# Patient Record
Sex: FEMALE | Race: WHITE | NOT HISPANIC OR LATINO | URBAN - METROPOLITAN AREA
[De-identification: names, ages, dates, MRNs, and addresses within clinical notes are randomized per-mention and may not be internally consistent; named-entity substitution may affect disease eponyms.]

---

## 2017-05-02 ENCOUNTER — COMMUNICATION - HEALTHEAST (OUTPATIENT)
Dept: FAMILY MEDICINE | Facility: CLINIC | Age: 22
End: 2017-05-02

## 2017-05-02 DIAGNOSIS — Z30.9 CONTRACEPTION MANAGEMENT: ICD-10-CM

## 2017-07-21 ENCOUNTER — COMMUNICATION - HEALTHEAST (OUTPATIENT)
Dept: FAMILY MEDICINE | Facility: CLINIC | Age: 22
End: 2017-07-21

## 2017-07-21 DIAGNOSIS — Z30.9 CONTRACEPTION MANAGEMENT: ICD-10-CM

## 2017-10-13 ENCOUNTER — COMMUNICATION - HEALTHEAST (OUTPATIENT)
Dept: FAMILY MEDICINE | Facility: CLINIC | Age: 22
End: 2017-10-13

## 2017-10-13 DIAGNOSIS — Z30.9 CONTRACEPTION MANAGEMENT: ICD-10-CM

## 2017-10-14 ENCOUNTER — COMMUNICATION - HEALTHEAST (OUTPATIENT)
Dept: FAMILY MEDICINE | Facility: CLINIC | Age: 22
End: 2017-10-14

## 2017-10-14 DIAGNOSIS — Z30.9 CONTRACEPTION MANAGEMENT: ICD-10-CM

## 2017-11-10 ENCOUNTER — COMMUNICATION - HEALTHEAST (OUTPATIENT)
Dept: FAMILY MEDICINE | Facility: CLINIC | Age: 22
End: 2017-11-10

## 2017-11-10 DIAGNOSIS — Z30.9 CONTRACEPTION MANAGEMENT: ICD-10-CM

## 2017-11-28 ENCOUNTER — RECORDS - HEALTHEAST (OUTPATIENT)
Dept: ADMINISTRATIVE | Facility: OTHER | Age: 22
End: 2017-11-28

## 2017-12-26 ENCOUNTER — COMMUNICATION - HEALTHEAST (OUTPATIENT)
Dept: FAMILY MEDICINE | Facility: CLINIC | Age: 22
End: 2017-12-26

## 2017-12-26 DIAGNOSIS — Z30.9 CONTRACEPTION MANAGEMENT: ICD-10-CM

## 2017-12-29 ENCOUNTER — OFFICE VISIT - HEALTHEAST (OUTPATIENT)
Dept: FAMILY MEDICINE | Facility: CLINIC | Age: 22
End: 2017-12-29

## 2017-12-29 DIAGNOSIS — Z30.9 CONTRACEPTION MANAGEMENT: ICD-10-CM

## 2017-12-29 DIAGNOSIS — F32.81 PMDD (PREMENSTRUAL DYSPHORIC DISORDER): ICD-10-CM

## 2017-12-29 DIAGNOSIS — Z11.8 SCREENING FOR CHLAMYDIAL DISEASE: ICD-10-CM

## 2018-01-19 ENCOUNTER — OFFICE VISIT - HEALTHEAST (OUTPATIENT)
Dept: FAMILY MEDICINE | Facility: CLINIC | Age: 23
End: 2018-01-19

## 2018-01-19 DIAGNOSIS — Z00.00 ROUTINE GENERAL MEDICAL EXAMINATION AT A HEALTH CARE FACILITY: ICD-10-CM

## 2018-01-19 DIAGNOSIS — Z30.9 CONTRACEPTION MANAGEMENT: ICD-10-CM

## 2018-01-19 DIAGNOSIS — Z01.84 IMMUNITY STATUS TESTING: ICD-10-CM

## 2018-01-19 RX ORDER — DROSPIRENONE AND ETHINYL ESTRADIOL 0.02-3(28)
1 KIT ORAL DAILY
Qty: 84 TABLET | Refills: 3 | Status: SHIPPED | OUTPATIENT
Start: 2018-01-19

## 2018-01-19 ASSESSMENT — MIFFLIN-ST. JEOR: SCORE: 1284.06

## 2018-01-23 ENCOUNTER — COMMUNICATION - HEALTHEAST (OUTPATIENT)
Dept: FAMILY MEDICINE | Facility: CLINIC | Age: 23
End: 2018-01-23

## 2018-01-23 DIAGNOSIS — Z30.9 CONTRACEPTION MANAGEMENT: ICD-10-CM

## 2018-01-31 LAB — RABV NAB SER NT-ACNC: 0.5 IU/ML

## 2018-04-30 ENCOUNTER — COMMUNICATION - HEALTHEAST (OUTPATIENT)
Dept: FAMILY MEDICINE | Facility: CLINIC | Age: 23
End: 2018-04-30

## 2018-04-30 DIAGNOSIS — Z30.9 CONTRACEPTION MANAGEMENT: ICD-10-CM

## 2018-10-19 ENCOUNTER — RECORDS - HEALTHEAST (OUTPATIENT)
Dept: ADMINISTRATIVE | Facility: OTHER | Age: 23
End: 2018-10-19

## 2018-10-19 LAB
CHLAMYDIA_EXT- HISTORICAL: NORMAL
SPECIMEN DESCRIPTION_EXT (HISTORICAL CONVERSION): NORMAL

## 2018-11-06 ENCOUNTER — OFFICE VISIT - HEALTHEAST (OUTPATIENT)
Dept: FAMILY MEDICINE | Facility: CLINIC | Age: 23
End: 2018-11-06

## 2018-11-06 DIAGNOSIS — F32.81 PMDD (PREMENSTRUAL DYSPHORIC DISORDER): ICD-10-CM

## 2018-11-06 DIAGNOSIS — K62.5 RECTAL BLEEDING: ICD-10-CM

## 2018-11-06 LAB
ALBUMIN SERPL-MCNC: 3.5 G/DL (ref 3.5–5)
ALP SERPL-CCNC: 65 U/L (ref 45–120)
ALT SERPL W P-5'-P-CCNC: 28 U/L (ref 0–45)
ANION GAP SERPL CALCULATED.3IONS-SCNC: 9 MMOL/L (ref 5–18)
AST SERPL W P-5'-P-CCNC: 39 U/L (ref 0–40)
BASOPHILS # BLD AUTO: 0.1 THOU/UL (ref 0–0.2)
BASOPHILS NFR BLD AUTO: 1 % (ref 0–2)
BILIRUB SERPL-MCNC: 0.2 MG/DL (ref 0–1)
BUN SERPL-MCNC: 15 MG/DL (ref 8–22)
CALCIUM SERPL-MCNC: 9.1 MG/DL (ref 8.5–10.5)
CHLORIDE BLD-SCNC: 104 MMOL/L (ref 98–107)
CO2 SERPL-SCNC: 25 MMOL/L (ref 22–31)
CREAT SERPL-MCNC: 0.81 MG/DL (ref 0.6–1.1)
EOSINOPHIL # BLD AUTO: 0.2 THOU/UL (ref 0–0.4)
EOSINOPHIL NFR BLD AUTO: 2 % (ref 0–6)
ERYTHROCYTE [DISTWIDTH] IN BLOOD BY AUTOMATED COUNT: 10.3 % (ref 11–14.5)
GFR SERPL CREATININE-BSD FRML MDRD: >60 ML/MIN/1.73M2
GLUCOSE BLD-MCNC: 85 MG/DL (ref 70–125)
HCT VFR BLD AUTO: 37.8 % (ref 35–47)
HGB BLD-MCNC: 13 G/DL (ref 12–16)
INR PPP: 0.92 (ref 0.9–1.1)
LYMPHOCYTES # BLD AUTO: 1.9 THOU/UL (ref 0.8–4.4)
LYMPHOCYTES NFR BLD AUTO: 22 % (ref 20–40)
MCH RBC QN AUTO: 30.9 PG (ref 27–34)
MCHC RBC AUTO-ENTMCNC: 34.3 G/DL (ref 32–36)
MCV RBC AUTO: 90 FL (ref 80–100)
MONOCYTES # BLD AUTO: 0.5 THOU/UL (ref 0–0.9)
MONOCYTES NFR BLD AUTO: 6 % (ref 2–10)
NEUTROPHILS # BLD AUTO: 6.2 THOU/UL (ref 2–7.7)
NEUTROPHILS NFR BLD AUTO: 70 % (ref 50–70)
PLATELET # BLD AUTO: 175 THOU/UL (ref 140–440)
PMV BLD AUTO: 9 FL (ref 7–10)
POTASSIUM BLD-SCNC: 4.6 MMOL/L (ref 3.5–5)
PROT SERPL-MCNC: 6.3 G/DL (ref 6–8)
RBC # BLD AUTO: 4.2 MILL/UL (ref 3.8–5.4)
SODIUM SERPL-SCNC: 138 MMOL/L (ref 136–145)
WBC: 8.8 THOU/UL (ref 4–11)

## 2018-11-08 LAB
BAKER'S YEAST IGA QN IA: <10 U
BAKER'S YEAST IGG QN IA: <10 U
NEUTROPHIL SPECIFIC ANTIBODIES (CONVERSION): NEGATIVE

## 2019-02-27 ENCOUNTER — COMMUNICATION - HEALTHEAST (OUTPATIENT)
Dept: FAMILY MEDICINE | Facility: CLINIC | Age: 24
End: 2019-02-27

## 2019-07-07 ENCOUNTER — COMMUNICATION - HEALTHEAST (OUTPATIENT)
Dept: FAMILY MEDICINE | Facility: CLINIC | Age: 24
End: 2019-07-07

## 2019-07-07 DIAGNOSIS — F32.A DEPRESSION: ICD-10-CM

## 2019-07-29 ENCOUNTER — OFFICE VISIT - HEALTHEAST (OUTPATIENT)
Dept: FAMILY MEDICINE | Facility: CLINIC | Age: 24
End: 2019-07-29

## 2019-07-29 DIAGNOSIS — Z13.29 SCREENING FOR ENDOCRINE, NUTRITIONAL, METABOLIC AND IMMUNITY DISORDER: ICD-10-CM

## 2019-07-29 DIAGNOSIS — Z13.21 SCREENING FOR ENDOCRINE, NUTRITIONAL, METABOLIC AND IMMUNITY DISORDER: ICD-10-CM

## 2019-07-29 DIAGNOSIS — Z13.228 SCREENING FOR ENDOCRINE, NUTRITIONAL, METABOLIC AND IMMUNITY DISORDER: ICD-10-CM

## 2019-07-29 DIAGNOSIS — Z11.4 SCREENING FOR HIV WITHOUT PRESENCE OF RISK FACTORS: ICD-10-CM

## 2019-07-29 DIAGNOSIS — Z00.00 VISIT FOR PREVENTIVE HEALTH EXAMINATION: ICD-10-CM

## 2019-07-29 DIAGNOSIS — Z13.0 SCREENING FOR ENDOCRINE, NUTRITIONAL, METABOLIC AND IMMUNITY DISORDER: ICD-10-CM

## 2019-07-29 DIAGNOSIS — F41.1 GAD (GENERALIZED ANXIETY DISORDER): ICD-10-CM

## 2019-07-29 LAB
ALBUMIN SERPL-MCNC: 3.4 G/DL (ref 3.5–5)
ALP SERPL-CCNC: 54 U/L (ref 45–120)
ALT SERPL W P-5'-P-CCNC: 21 U/L (ref 0–45)
ANION GAP SERPL CALCULATED.3IONS-SCNC: 9 MMOL/L (ref 5–18)
AST SERPL W P-5'-P-CCNC: 26 U/L (ref 0–40)
BILIRUB SERPL-MCNC: 0.3 MG/DL (ref 0–1)
BUN SERPL-MCNC: 14 MG/DL (ref 8–22)
CALCIUM SERPL-MCNC: 9.5 MG/DL (ref 8.5–10.5)
CHLORIDE BLD-SCNC: 107 MMOL/L (ref 98–107)
CHOLEST SERPL-MCNC: 183 MG/DL
CO2 SERPL-SCNC: 25 MMOL/L (ref 22–31)
CREAT SERPL-MCNC: 0.83 MG/DL (ref 0.6–1.1)
FASTING STATUS PATIENT QL REPORTED: YES
GFR SERPL CREATININE-BSD FRML MDRD: >60 ML/MIN/1.73M2
GLUCOSE BLD-MCNC: 79 MG/DL (ref 70–125)
HDLC SERPL-MCNC: 79 MG/DL
HIV 1+2 AB+HIV1 P24 AG SERPL QL IA: NEGATIVE
LDLC SERPL CALC-MCNC: 85 MG/DL
POTASSIUM BLD-SCNC: 4.4 MMOL/L (ref 3.5–5)
PROT SERPL-MCNC: 6 G/DL (ref 6–8)
SODIUM SERPL-SCNC: 141 MMOL/L (ref 136–145)
TRIGL SERPL-MCNC: 97 MG/DL

## 2019-07-29 ASSESSMENT — MIFFLIN-ST. JEOR: SCORE: 1273.86

## 2019-08-16 ENCOUNTER — COMMUNICATION - HEALTHEAST (OUTPATIENT)
Dept: FAMILY MEDICINE | Facility: CLINIC | Age: 24
End: 2019-08-16

## 2019-08-16 DIAGNOSIS — F41.1 GAD (GENERALIZED ANXIETY DISORDER): ICD-10-CM

## 2019-08-26 ENCOUNTER — RECORDS - HEALTHEAST (OUTPATIENT)
Dept: HEALTH INFORMATION MANAGEMENT | Facility: CLINIC | Age: 24
End: 2019-08-26

## 2019-10-24 ENCOUNTER — OFFICE VISIT - HEALTHEAST (OUTPATIENT)
Dept: FAMILY MEDICINE | Facility: CLINIC | Age: 24
End: 2019-10-24

## 2019-10-24 DIAGNOSIS — K21.9 GASTRO-ESOPHAGEAL REFLUX DISEASE WITHOUT ESOPHAGITIS: ICD-10-CM

## 2019-10-24 DIAGNOSIS — R12 HEARTBURN: ICD-10-CM

## 2019-10-24 RX ORDER — SUCRALFATE ORAL 1 G/10ML
1 SUSPENSION ORAL 4 TIMES DAILY
Qty: 420 ML | Refills: 1 | Status: SHIPPED | OUTPATIENT
Start: 2019-10-24

## 2019-11-15 ENCOUNTER — COMMUNICATION - HEALTHEAST (OUTPATIENT)
Dept: FAMILY MEDICINE | Facility: CLINIC | Age: 24
End: 2019-11-15

## 2019-11-15 DIAGNOSIS — K21.9 GASTRO-ESOPHAGEAL REFLUX DISEASE WITHOUT ESOPHAGITIS: ICD-10-CM

## 2019-11-16 RX ORDER — FAMOTIDINE 20 MG/1
TABLET, FILM COATED ORAL
Qty: 180 TABLET | Refills: 1 | Status: SHIPPED | OUTPATIENT
Start: 2019-11-16

## 2020-05-13 ENCOUNTER — OFFICE VISIT - HEALTHEAST (OUTPATIENT)
Dept: FAMILY MEDICINE | Facility: CLINIC | Age: 25
End: 2020-05-13

## 2020-05-13 DIAGNOSIS — F41.1 GAD (GENERALIZED ANXIETY DISORDER): ICD-10-CM

## 2020-05-13 DIAGNOSIS — R53.83 FATIGUE, UNSPECIFIED TYPE: ICD-10-CM

## 2020-08-04 ENCOUNTER — COMMUNICATION - HEALTHEAST (OUTPATIENT)
Dept: FAMILY MEDICINE | Facility: CLINIC | Age: 25
End: 2020-08-04

## 2020-08-04 DIAGNOSIS — F41.1 GAD (GENERALIZED ANXIETY DISORDER): ICD-10-CM

## 2020-08-11 ENCOUNTER — COMMUNICATION - HEALTHEAST (OUTPATIENT)
Dept: FAMILY MEDICINE | Facility: CLINIC | Age: 25
End: 2020-08-11

## 2021-05-25 ENCOUNTER — RECORDS - HEALTHEAST (OUTPATIENT)
Dept: ADMINISTRATIVE | Facility: CLINIC | Age: 26
End: 2021-05-25

## 2021-05-27 ENCOUNTER — RECORDS - HEALTHEAST (OUTPATIENT)
Dept: ADMINISTRATIVE | Facility: CLINIC | Age: 26
End: 2021-05-27

## 2021-05-30 NOTE — TELEPHONE ENCOUNTER
Refill Approved    Rx renewed per Medication Renewal Policy. Medication was last renewed on 11/16/2018.    Eric Zavala, [facility] Triage/Med Refill 7/7/2019     Requested Prescriptions   Pending Prescriptions Disp Refills     FLUoxetine (PROZAC) 20 MG capsule [Pharmacy Med Name: FLUOXETINE HCL 20 MG CAPSULE] 30 capsule 1     Sig: TAKE 1 CAPSULE BY MOUTH EVERY DAY       SSRI Refill Protocol  Passed - 7/7/2019  9:32 AM        Passed - PCP or prescribing provider visit in last year     Last office visit with prescriber/PCP: 11/6/2018 India Chairez MD OR same dept: 11/6/2018 India Chairez MD OR same specialty: 11/6/2018 India Chairez MD  Last physical: Visit date not found Last MTM visit: Visit date not found   Next visit within 3 mo: Visit date not found  Next physical within 3 mo: Visit date not found  Prescriber OR PCP: India Chairez MD  Last diagnosis associated with med order: There are no diagnoses linked to this encounter.  If protocol passes may refill for 12 months if within 3 months of last provider visit (or a total of 15 months).

## 2021-05-30 NOTE — PROGRESS NOTES
FEMALE ADULT PREVENTIVE EXAM    CHIEF COMPLAINT:  Female preventive exam.    SUBJECTIVE:  Dorcas Taylor is a 24 y.o. female who presents for her routine physical exam.    Patient would like to address the following concerns today: Anxiety seems to be to be more pronounced lately, has been on Prozac 20 mg for the past 3 years, wondering if  Increasing the dose will help.  Jammed her right fifth pinky while playing volleyball about a month ago, occasionally feel a click in the morning.  But no pain or swelling.  Screening Pap smear done at the GYN office, she did sign for release of record.      GYNE HISTORY  Menses: yes  Sexually Active: na  Contraception: yes OCP  Last Pap: 2018 (@ [facility])  Abnormal Pap: no  Vaginal Discharge: no      She  has a past medical history of Anxiety, Major depression, single episode, and PMDD (premenstrual dysphoric disorder).    Lab Results   Component Value Date    WBC 8.8 11/06/2018    HGB 13.0 11/06/2018    HCT 37.8 11/06/2018    MCV 90 11/06/2018     11/06/2018     11/06/2018    K 4.6 11/06/2018    BUN 15 11/06/2018     No results found for: CHOL, HDL, LDLCALC, TRIG  Lab Results   Component Value Date    TSH 0.94 06/17/2014     BP Readings from Last 3 Encounters:   07/29/19 91/60   11/06/18 110/58   01/19/18 90/60       Surgeries:    Past Surgical History:   Procedure Laterality Date     WISDOM TOOTH EXTRACTION         Family History:  Her family history includes Breast cancer (age of onset: 47) in her paternal aunt; Depression in her paternal grandmother; Hypertension in her paternal grandfather; Lymphoma (age of onset: 70) in her maternal grandmother.    Social History:  She  reports that she has never smoked. She has never used smokeless tobacco. She reports that she does not drink alcohol or use drugs.    Medications:    Current Outpatient Medications:      FLUoxetine (PROZAC) 40 MG capsule, TAKE 1 CAPSULE BY MOUTH EVERY DAY, Disp: 30 capsule, Rfl: 6      GIANVI, 28, 3-0.02 mg per tablet, Take 1 tablet by mouth daily., Disp: 84 tablet, Rfl: 3     mv-min/iron/folic/calcium/vitK (WOMEN'S MULTIVITAMIN ORAL), Take by mouth., Disp: , Rfl:   HELD MEDICATIONS: None.    Allergies:  No latex allergies.  Allergies   Allergen Reactions     Septra [Sulfamethoxazole-Trimethoprim] Angioedema     Facial swelling.     Sulfa (Sulfonamide Antibiotics)             RISK BEHAVIOR & HEALTH HABITS  Self Breast Exam: yes.  Regular Exercise: yes.  Balanced diet: yes.  Seat Belt Use: yes  Calcium intake/Osteoporosis prevention: yes.    Guns: NA  Sun Screen: YES  Dental Care: YES    REVIEW OF SYSTEMS:  Complete head to toe review of systems is otherwise negative except as above.    OBJECTIVE:  VITAL SIGNS:    Vitals:    07/29/19 1005   BP: 91/60   Pulse: 62   SpO2: 97%     GENERAL:  Patient alert, in no acute distress.  EYES: PERRLA. Extraoccular movements intact, pupils equal, reactive to light and accommodation.  Normal conjunctiva and lids.    ENT:  Hearing grossly normal.  Normal appearance to ears and nose.  Bilateral TM s, external canals, oropharynx normal. Normal lips, gums and teeth.    NECK:  Supple, without thyromegaly or mass, no adenopathies.  RESP:  Clear to auscultation without crackles, wheezes or distress.  Normal respiratory effort.   CV:  Regular rate and rhythm without murmurs, rubs or gallops.  Normal pedal pulses.  No varicosities or edema.  ABDOMEN:  Soft, non-tender, without hepatosplenomegaly, masses, or hernias.   BREASTS:  declined   :  Rectal: declined   LYMPHATIC: Normal palpation of neck.  No lymphadenopathy.  No bruising.  NEURO:  CN II-XII intact, motor & sensory function all intact.  DTR and reflexes normal.  PSYCHIATRIC:  Alert & oriented with normal mood and affect.  Good judgment and insight.  SKIN:  Normal inspection and palpation.  MUSCULOSKELETAL: Normal gait and station.  - Spine / Ribs / Pelvis: Normal inspection, ROM, stability and strength: Spine,  Head, Neck, Upper and Lower Extremities.    ASSESSMENT & PLAN  Dorcas was seen today for establish care, annual exam, medication dose change and hand pain.    Diagnoses and all orders for this visit:    Visit for preventive health examination    QUE (generalized anxiety disorder)  -     FLUoxetine (PROZAC) 40 MG capsule; TAKE 1 CAPSULE BY MOUTH EVERY DAY    Screening for endocrine, nutritional, metabolic and immunity disorder  -     Comprehensive Metabolic Panel  -     Lipid Cascade    Screening for HIV without presence of risk factors  -     HIV Antigen/Antibody Screening [place]      General  Immunizations reviewed and updated .  Alcohol use, safety and moderation discussed.  Recommended adequate calcium, vitamin D intake/osteoporosis prevention.  Discussed colon cancer screening at age 50, 45 if -American.  Diet and exercise reviewed, including goal of aerobic exercise 30-90 minutes most days of the week, moderation of portions sizes, avoiding eating out and fast food and increase in fruits and vegetables.  Discussed safe sex practices.    Discussed & recommended seat belt (& motorcycle helmet) use.  Discussed & recommended smoke detector.  Discussed sun protection.  Discussed weight management.  Discussed self breast exam, screening mammogram timing.  The following high BMI interventions were performed this visit: encouragement to exercise    India Chairez MD

## 2021-05-31 VITALS — BODY MASS INDEX: 20.24 KG/M2 | HEIGHT: 65 IN | WEIGHT: 121.5 LBS

## 2021-05-31 VITALS — WEIGHT: 120.9 LBS | BODY MASS INDEX: 20.12 KG/M2

## 2021-05-31 NOTE — TELEPHONE ENCOUNTER
Refill Approved   Pharmacy note for 90 day supply.  Also a note for PA    Rx renewed per Medication Renewal Policy. Medication was last renewed on 7/29/2019 for 30/6  Last OV 7/29/2019 to Establish Care    Kaylee Summers, [facility] Triage/Med Refill 8/17/2019     Requested Prescriptions   Pending Prescriptions Disp Refills     FLUoxetine (PROZAC) 40 MG capsule 90 capsule 0     Sig: TAKE 1 CAPSULE BY MOUTH EVERY DAY       SSRI Refill Protocol  Passed - 8/16/2019  2:10 PM        Passed - PCP or prescribing provider visit in last year     Last office visit with prescriber/PCP: 11/6/2018 India Chairez MD OR same dept: 11/6/2018 India Chairez MD OR same specialty: 11/6/2018 India Chairez MD  Last physical: 7/29/2019 Last MTM visit: Visit date not found   Next visit within 3 mo: Visit date not found  Next physical within 3 mo: Visit date not found  Prescriber OR PCP: India Chairez MD  Last diagnosis associated with med order: 1. QUE (generalized anxiety disorder)  - FLUoxetine (PROZAC) 40 MG capsule; TAKE 1 CAPSULE BY MOUTH EVERY DAY  Dispense: 90 capsule; Refill: 0    If protocol passes may refill for 12 months if within 3 months of last provider visit (or a total of 15 months).

## 2021-06-02 VITALS — BODY MASS INDEX: 20.79 KG/M2 | WEIGHT: 123 LBS

## 2021-06-03 VITALS — HEIGHT: 65 IN | BODY MASS INDEX: 19.58 KG/M2 | WEIGHT: 117.5 LBS

## 2021-06-03 NOTE — TELEPHONE ENCOUNTER
Refill Approved    Rx renewed per Medication Renewal Policy. Medication was last renewed on 10/24/19.    Mira Golden, [facility] Triage/Med Refill 11/16/2019     Requested Prescriptions   Pending Prescriptions Disp Refills     famotidine (PEPCID) 20 MG tablet [Pharmacy Med Name: FAMOTIDINE 20 MG TABLET] 60 tablet 2     Sig: TAKE 1 TABLET BY MOUTH TWICE A DAY       GI Medications Refill Protocol Passed - 11/15/2019  9:36 AM        Passed - PCP or prescribing provider visit in last 12 or next 3 months.     Last office visit with prescriber/PCP: 11/6/2018 India Chairez MD OR same dept: Visit date not found OR same specialty: 11/6/2018 nIdia Chairez MD  Last physical: 7/29/2019 Last MTM visit: Visit date not found   Next visit within 3 mo: Visit date not found  Next physical within 3 mo: Visit date not found  Prescriber OR PCP: India Chairez MD  Last diagnosis associated with med order: 1. Gastro-esophageal reflux disease without esophagitis  - famotidine (PEPCID) 20 MG tablet [Pharmacy Med Name: FAMOTIDINE 20 MG TABLET]; TAKE 1 TABLET BY MOUTH TWICE A DAY  Dispense: 60 tablet; Refill: 2    If protocol passes may refill for 12 months if within 3 months of last provider visit (or a total of 15 months).

## 2021-06-08 NOTE — PATIENT INSTRUCTIONS - HE
Based on the information that you have provided,it  is difficult to determine if this is only the medication or perhaps something else and I am sure the current situation with the COVID-19 crisis is not making things better; my suggestion will be to taper down the Prozac to 20 mg daily, start taking vitamin D 2 to 4000 unit daily, and hopefully your  symptoms will improve, otherwise I did place some future order to get some lab works(thyroid, iron etc.) done, you can just call to schedule.

## 2021-06-10 NOTE — TELEPHONE ENCOUNTER
RN cannot approve Refill Request    RN can NOT refill this medication Protocol failed and NO refill given. Last office visit: 11/6/2018 India Chairez MD Last Physical: 7/29/2019 Last MTM visit: Visit date not found Last visit same specialty: 11/6/2018 India Chairez MD.  Next visit within 3 mo: Visit date not found  Next physical within 3 mo: Visit date not found      Erika Cagle, [facility] Triage/Med Refill 8/4/2020    Requested Prescriptions   Pending Prescriptions Disp Refills     FLUoxetine (PROZAC) 20 MG capsule [Pharmacy Med Name: FLUOXETINE HCL 20 MG CAPSULE] 90 capsule 0     Sig: TAKE 1 CAPSULE BY MOUTH EVERY DAY       SSRI Refill Protocol  Failed - 8/4/2020  2:06 AM        Failed - PCP or prescribing provider visit in last year     Last office visit with prescriber/PCP: 11/6/2018 India Chairez MD OR same dept: Visit date not found OR same specialty: 11/6/2018 India Chairez MD  Last physical: 7/29/2019 Last MTM visit: Visit date not found   Next visit within 3 mo: Visit date not found  Next physical within 3 mo: Visit date not found  Prescriber OR PCP: India Chairez MD  Last diagnosis associated with med order: 1. QUE (generalized anxiety disorder)  - FLUoxetine (PROZAC) 20 MG capsule [Pharmacy Med Name: FLUOXETINE HCL 20 MG CAPSULE]; TAKE 1 CAPSULE BY MOUTH EVERY DAY  Dispense: 90 capsule; Refill: 0    If protocol passes may refill for 12 months if within 3 months of last provider visit (or a total of 15 months).

## 2021-06-10 NOTE — TELEPHONE ENCOUNTER
Left message #2 at [phone]. Sending letter out and postponing task out to 2 weeks and will try again if an appointment hasn't been made.

## 2021-06-15 NOTE — PROGRESS NOTES
Assessment/Plan:        Diagnoses and all orders for this visit:    Routine general medical examination at a health care facility    Contraception management  -     GIANVI, 28, 3-0.02 mg per tablet; Take 1 tablet by mouth daily.  Dispense: 84 tablet; Refill: 3    Immunity status testing  -     Rabies Antibody Endpoint        Continue with OCP.  Discussed medication and side effects.  Monitor blood pressure.  Continue with healthy food choices, regular exercise.  Encourage annual physical.  We will also do a rabies titer and inform her once results are available.  She was agreeable with the plans.  Subjective:    Patient ID: Dorcas Taylor is a 22 y.o. female.    HPI    Dorcas is here for her physical.  She is currently in OCP.  Has not been able to check her blood pressure on her own.  Has been taking her medication regularly.  Denies any ill effects from it.  Her last Pap smear was in June 2016, normal.  Screening for chlamydia in December 2017, negative.    Her Prozac is followed by her gynecologist.  Use for anxiety, depression, PMDD.    She wonders about having her rabies titer done.  Her last check was in 2016.  Her last rabies preexposure prophylaxis was given around 7-8 years ago.  No recent injury.    Review of Systems  As above otherwise negative.    Past medical history, surgery and family history reviewed and as above.  Paternal aunt with breast cancer at 47.  Maternal grandmother with lymphoma.  Paternal grandmother with depression.  Paternal grandfather with hypertension.    Social history: Denies any issues with smoking.  1-2 alcoholic beverage per week.  She tries to exercise 3 days a week.  Aerobics or weights for around 45-60 minutes.  Has been eating healthy.  Limited red meat or sweets.  She is a student right now, .  Working at the [employer] as well.        Objective:    Physical Exam  BP 90/60 (Patient Site: Left Arm, Patient Position: Sitting, Cuff Size: Adult Regular)   "Pulse (!) 54  Ht 5' 4.5\" (1.638 m)  Wt 121 lb 8 oz (55.1 kg)  SpO2 100%  Breastfeeding? No  BMI 20.53 kg/m2    Vital signs noted above. AAO ×3.  HEENT negative.  Neck: Supple neck, nonpalpable cervical lymph nodes. No thyromegaly. Lungs: Clear to auscultation bilateral.  Heart: S1-S2 regular rate and rhythm, no murmurs were noted.  Abdomen: Flat, soft with bowel sounds and nontender.  Extremities: No edema, pulses were full and equal. Breast exam: No nipple bleeding or discharge, no mass or tenderness, no axillary lymphadenopathy.  Pelvic exam: Negative CMT, no adnexal mass or tenderness.          "

## 2021-06-15 NOTE — PROGRESS NOTES
Assessment/Plan:        Diagnoses and all orders for this visit:    Screening for chlamydial disease  -     Chlamydia trachomatis & Neisseria gonorrhoeae, Amplified Detection    Contraception management  -     GIANVI, 28, 3-0.02 mg per tablet; Take 1 tablet by mouth daily.  Dispense: 84 tablet; Refill: 3    PMDD (premenstrual dysphoric disorder)    Other orders  -     FLUoxetine (PROZAC) 20 MG capsule;   -     Discontinue: permethrin (ELIMITE) 5 % cream;   -     Discontinue: triamcinolone (KENALOG) 0.1 % cream;         Continue with the OCP at this time.  Discussed medication and side effects including other contraceptives and IUD.  Monitor blood pressure.  Encourage annual physical.  Will also do chlamydia, gonorrhea screening.  She will check with GYN regarding her fluoxetine as the have been managing it for her for her PMDD.  She will decide in the future if she would have it managed in one place and consider physical.  She was agreeable with the plans.    25 minutes spent with more than 50% in counseling discussion of treatment plans.  Subjective:    Patient ID: Dorcas Taylor is a 22 y.o. female.    HPI    Dorcas is here for follow-up regarding her medication.  Currently on Gianvi.  Has been taking this for PMDD and contraceptive purposes.  She denies any changes with her cycles or spotting in between.  No breast discomfort or soreness.  Denies any smoking.  No history of thrombophilia.  Paternal aunt diagnosed with breast cancer at 47.      She was started on fluoxetine by GYN.  This for PMDD.  Has seen them a few times.  Unsure when her last visit with them.  Her last physical was in June 2016.    Family history: Maternal grandmother with lymphoma.    Review of Systems  As above otherwise negative.          Objective:    Physical Exam  /60 (Patient Site: Left Arm, Patient Position: Sitting, Cuff Size: Adult Regular)  Pulse (!) 58  Wt 120 lb 14.4 oz (54.8 kg)  SpO2 98%  Breastfeeding? No  BMI  20.12 kg/m2    Vital signs noted above. AAO ×3.  HEENT no nasal discharge, moist oral mucosa. Neck: Supple neck, nonpalpable cervical lymph nodes.  Lungs: Clear to auscultation bilateral.  Heart: S1-S2 regular rate and rhythm, no murmurs were noted.  Abdomen: Flat, soft with bowel sounds and nontender.

## 2021-06-16 PROBLEM — F32.81 PMDD (PREMENSTRUAL DYSPHORIC DISORDER): Status: ACTIVE | Noted: 2017-12-29

## 2021-06-16 PROBLEM — F41.1 GAD (GENERALIZED ANXIETY DISORDER): Status: ACTIVE | Noted: 2019-07-29

## 2021-06-17 NOTE — PATIENT INSTRUCTIONS - HE
Patient Instructions by India Chairez MD at 7/29/2019 10:00 AM     Author: India Chairez MD Service: -- Author Type: Physician    Filed: 7/29/2019 10:45 AM Encounter Date: 7/29/2019 Status: Signed    : India Chairez MD (Physician)       Patient Education     Prevention Guidelines, Women Ages 18 to 39  Screening tests and vaccines are an important part of managing your health. A screening test is done to find possible disorders or diseases in people who don't have any symptoms. The goal is to find a disease early so lifestyle changes can be made and you can be watched more closely to reduce the risk of disease, or to detect it early enough to treat it most effectively. Screening tests are not considered diagnostic, but are used to determine if more testing is needed. Health counseling is essential, too. Below are guidelines for these, for women ages 18 to 39. Talk with your healthcare provider to make sure youre up-to-date on what you need.  Screening Who needs it How often   Alcohol misuse All women in this age group At routine exams   Blood pressure All women in this age group Yearly checkup if your blood pressure is normal  Normal blood pressure is less than 120/80 mm Hg  If your blood pressure reading is higher than normal, follow the advice of your healthcare provider   Breast cancer All women in this age group should talk with their healthcare providers about the need for clinical breast exams (CBE)1 Clinical breast exam every 3 years1   Cervical cancer Women ages 21 and older Women between ages 21 and 29 should have a Pap test every 3 years; women between ages 30 and 65 are advised to have a Pap test plus an HPV test every 5 years   Chlamydia Sexually active women ages 24 and younger, and women at increased risk for infection Every 3 years if you're at risk or have symptoms   Depression All women in this age group At routine exams   Type 2 diabates, prediabetes All  women with no symptoms who are overweight or obese and have 1 or more other risk factors for diabetes At least every 3 years. Also, testing for diabetes during pregnancy after the 24th week.    Type 2 diabetes, prediabetes All women diagnosed with gestational diabetes Lifelong testing every 3 years   Type 2 diabetes All women with prediabetes Every year   Gonorrhea Sexually active women at increased risk for infection At routine exams   Hepatitis C Anyone at increased risk At routine exams   HIV All women should be tested at least once for HIV between the ages of 13 and 64 At routine exams. Those with risk factors for HIV should be tested at least annually.    Obesity All women in this age group At routine exams   Syphilis Women at increased risk for infection should talk with their healthcare provider At routine exams   Tuberculosis Women at increased risk for infection should talk with their healthcare provider Ask your healthcare provider   Vision All women in this age group At least 1 complete exam in your 20s, and 2 in your 30s   Vaccine2 Who needs it How often   Chickenpox (varicella) All women in this age group who have no record of this infection or vaccine 2 doses; the second dose should be given 4 to 8 weeks after the first dose   Hepatitis A Women at increased risk for infection should talk with their healthcare provider 2 doses given at least 6 months apart   Hepatitis B Women at increased risk for infection should talk with their healthcare provider 3 doses over 6 months; second dose should be given 1 month after the first dose; the third dose should be given at least 2 months after the second dose and at least 4 months after the first dose   Haemophilus influenzae Type B (HIB) Women at increased risk for infection should talk with their healthcare provider 1 to 3 doses   Human papillomavirus (HPV) All women in this age group up to age 26 3 doses; the second dose should be given 1 to 2 months after the  first dose and the third dose given 6 months after the first dose   Influenza (flu) All women in this age group Once a year   Measles, mumps, rubella (MMR) All women in this age group who have no record of these infections or vaccines 1 or 2 doses   Meningococcal Women at increased risk for infection should talk with their healthcare provider 1 or more doses   Pneumococcal conjugate vaccine (PCV13) and pneumococcal polysaccharide vaccine (PPSV23) Women at increased risk for infection should talk with their healthcare provider PCV13: 1 dose ages 19 to 65 (protects against 13 types of pneumococcal bacteria)  PPSV23: 1 to 2 doses through age 64, or 1 dose at 65 or older (protects against 23 types of pneumococcal bacteria)      Tetanus/diphtheria/pertussis (Td/Tdap) booster All women in this age group Td every 10 years, or a one-time dose of Tdap instead of a Td booster after age 18, then Td every 10 years   Counseling Who needs it How often   BRCA gene mutation testing for breast and ovarian cancer susceptibility Women with increased risk for having gene mutation When your risk is known   Breast cancer and chemoprevention Women at high risk for breast cancer When your risk is known   Diet and exercise Women who are overweight or obese When diagnosed, and then at routine exams   Domestic violence Women at the age in which they are able to have children At routine exams   Sexually transmitted infection prevention Women who are sexually active At routine exams   Skin cancer Prevention of skin cancer in fair-skinned adults At routine exams   Use of tobacco and the health effects it can cause All women in this age group Every visit   1According to the ACS, women ages 20 to 39 years should have a clinical breast exam (CBE) as part of their routine health exam every 3 years. Breast self-exams are an option for women starting in their 20s. But the USPSTF does not recommend CBE.  2Those who are 18 years old and not up-to-date  on their childhood vaccines should get all appropriate catch-up vaccines recommended by the CDC.  Date Last Reviewed: 10/1/2017    5371-6187 [employer] [address] [facility], [place] [address]. All rights reserved. This information is not intended as a substitute for professional medical care. Always follow your healthcare professional's instructions.

## 2021-06-17 NOTE — PATIENT INSTRUCTIONS - HE
Patient Instructions by India Chairez MD at 10/24/2019 12:27 PM     Author: India Chairez MD Service: -- Author Type: Physician    Filed: 10/24/2019 12:27 PM Encounter Date: 10/24/2019 Status: Signed    : India Chairez MD (Physician)           Lifestyle Changes for Controlling GERD  When you have GERD, stomach acid feels as if its backing up toward your mouth. Making lifestyle changes can often improve your symptoms. This is true if you take medicine to control your GERD or not. Talk with your healthcare provider about the following suggestions. They may help you get relief from your symptoms.      Raise your head  Reflux is more likely to happen when youre lying down flat. That's because stomach fluid can flow backward more easily. Raising the head of your bed 4 to 6 inches can help. To do this:    Slide blocks or books under the legs at the head of your bed. Or put a wedge under the mattress. Many [employer] can make a wedge for you. The wedge should go from your waist to the top of your head.    Dont just prop your head up on a few pillows. This increases pressure on your stomach. It can make GERD worse.  Watch your eating habits  Certain foods may increase the acid in your stomach. Or they may relax the lower esophageal sphincter. This makes GERD more likely. Its best to avoid the following if they cause you symptoms:    Coffee, tea, and carbonated drinks (with and without caffeine)    Fatty, fried, or spicy food    Mint, chocolate, onions, tomatoes, and citrus    Peppermint    Any other foods that seem to irritate your stomach or cause you pain  Relieve the pressure  Tips include the following:    Eat smaller meals, even if you have to eat more often.    Dont lie down right after you eat. Wait a few hours for your stomach to empty.    Don't wear tight belts or tight-fitting clothes.    Lose any extra weight.  Tobacco and alcohol  Don't smoke tobacco or drink alcohol.  They can make GERD symptoms worse.  Date Last Reviewed: 7/1/2016 2000-2019 [employer]. [address] [place] [address], [place] [address]. All rights reserved. This information is not intended as a substitute for professional medical care. Always follow your healthcare professional's instructions.        Avoid eating before sleep, and avoid alcohol and caffeine while symptoms are ongoing.

## 2021-06-20 NOTE — LETTER
Letter by India Chairez MD at      Author: India Chairez MD Service: -- Author Type: --    Filed:  Encounter Date: 8/11/2020 Status: (Other)         Dorcas Taylor  [address] [place] [address]      August 11, 2020      Dear Dorcas,    As a valued [facility] patient, your healthcare needs are our priority.  Your health care team has determined that you are due for an appointment regarding your medication follow up.    To help prevent delays in your care, please call the [facility] at [phone].    We look forward to partnering with you to achieve optimal health and wellbeing.    Sincerely,  Your care team at [facility] and [facility]

## 2021-06-21 NOTE — PROGRESS NOTES
OFFICE VISIT - FAMILY MEDICINE     ASSESSMENT AND PLAN     1. Rectal bleeding  HM1(CBC and Differential)    Inflammatory Bowel Disease (IBD) Serology Panel    Comprehensive Metabolic Panel    INR    HM1(CBC and Differential)    Inflammatory Bowel Disease (IBD) Serology Panel    Comprehensive Metabolic Panel    INR    HM1 (CBC with Diff)   2. PMDD (premenstrual dysphoric disorder)     Rectal bleeding, differential diagnosis discussed, likely from internal hemorrhoids, treatment option discussed included sitz bath, high fibers and fluid diet, topical anti-inflammatory agent, consider referral to colorectal physician if she is not improving.  CBC, IBD panel result pending.  Mild PMDD, refill Prozac, she seems to be tolerating well.  CHIEF COMPLAINT   Depression and Rectal Bleeding (over the last 6 months pt has noticed some blood when wiping after a bowel movements, no blood in the stool, lately has been having some itching and possibly prolapse)    HPI   Dorcas Taylor is a 23 y.o. female.  No Patient Care Coordination Note on file.  This is a pleasant 23 years old female that I am seeing for the first time usually gets her care at the [facility], she stated that about 6 months ago she started noticing some blood in the stool while she was wiping after a bowel movement, she saw her gynecologist at that time, she recommended high fibers and fluid symptoms, symptoms subsided for a little while, but for the past few weeks she has been noticing more itching,  more blood and feels like something is prolapsing in her rectal area.  Has been taking some stool softener with some fibers with no significant improvement.  Has not tried anything topically.  No prior history of rectal bleeding or abdominal surgery, no abdominal pain.  She also has a history of premenstrual dysphoric disorders, symptom has been well controlled with oral contraception on the Prozac, she is asking for a refill, she is nonsuicidal.    Review  of Systems As per HPI, otherwise negative.    OBJECTIVE   /58 (Patient Site: Left Arm, Patient Position: Sitting, Cuff Size: Adult Regular)  Pulse 62  Wt 123 lb (55.8 kg)  SpO2 100%  BMI 20.79 kg/m2  Physical Exam   Constitutional: She is oriented to person, place, and time. She appears well-developed and well-nourished.   HENT:   Head: Normocephalic and atraumatic.   Cardiovascular: Normal rate, regular rhythm, normal heart sounds and intact distal pulses.  Exam reveals no gallop and no friction rub.    No murmur heard.  Pulmonary/Chest: Effort normal and breath sounds normal. No respiratory distress. She has no wheezes. She has no rales.   Genitourinary:   Genitourinary Comments: External rectal exam showed no lesion, anoscope exam shows some mild abrasion transitional zone, zone,Couple of small ( few mm) nonthrombosed internal hemorrhoid, around 2:00 area.  Exam was chaperoned by a female CNA (Veronica)   Musculoskeletal: She exhibits no edema or tenderness.   Neurological: She is alert and oriented to person, place, and time. Coordination normal.   Psychiatric: Judgment and thought content normal. Her mood appears anxious.       [facility]     Family History   Problem Relation Age of Onset     Depression Paternal Grandmother      Breast cancer Paternal Aunt 47     Hypertension Paternal Grandfather      Lymphoma Maternal Grandmother 70     Social History     Social History     Marital status: Single     Spouse name: N/A     Number of children: N/A     Years of education: N/A     Occupational History     Not on file.     Social History Main Topics     Smoking status: Never Smoker     Smokeless tobacco: Never Used     Alcohol use No     Drug use: No     Sexual activity: Not on file     Other Topics Concern     Not on file     Social History Narrative     Relevant history was reviewed with the patient today, unless noted in HPI, nothing is pertinent for this visit.  [facility]     Patient Active Problem List    Diagnosis  Date Noted     PMDD (premenstrual dysphoric disorder) 12/29/2017     Depression 06/02/2015     Past Surgical History:   Procedure Laterality Date     WISDOM TOOTH EXTRACTION         RESULTS/CONSULTS (Lab/Rad)     Recent Results (from the past 168 hour(s))   Comprehensive Metabolic Panel   Result Value Ref Range    Sodium 138 136 - 145 mmol/L    Potassium 4.6 3.5 - 5.0 mmol/L    Chloride 104 98 - 107 mmol/L    CO2 25 22 - 31 mmol/L    Anion Gap, Calculation 9 5 - 18 mmol/L    Glucose 85 70 - 125 mg/dL    BUN 15 8 - 22 mg/dL    Creatinine 0.81 0.60 - 1.10 mg/dL    GFR MDRD Af Amer >60 >60 mL/min/1.73m2    GFR MDRD Non Af Amer >60 >60 mL/min/1.73m2    Bilirubin, Total 0.2 0.0 - 1.0 mg/dL    Calcium 9.1 8.5 - 10.5 mg/dL    Protein, Total 6.3 6.0 - 8.0 g/dL    Albumin 3.5 3.5 - 5.0 g/dL    Alkaline Phosphatase 65 45 - 120 U/L    AST 39 0 - 40 U/L    ALT 28 0 - 45 U/L   INR   Result Value Ref Range    INR 0.92 0.90 - 1.10   HM1 (CBC with Diff)   Result Value Ref Range    WBC 8.8 4.0 - 11.0 thou/uL    RBC 4.20 3.80 - 5.40 mill/uL    Hemoglobin 13.0 12.0 - 16.0 g/dL    Hematocrit 37.8 35.0 - 47.0 %    MCV 90 80 - 100 fL    MCH 30.9 27.0 - 34.0 pg    MCHC 34.3 32.0 - 36.0 g/dL    RDW 10.3 (L) 11.0 - 14.5 %    Platelets 175 140 - 440 thou/uL    MPV 9.0 7.0 - 10.0 fL    Neutrophils % 70 50 - 70 %    Lymphocytes % 22 20 - 40 %    Monocytes % 6 2 - 10 %    Eosinophils % 2 0 - 6 %    Basophils % 1 0 - 2 %    Neutrophils Absolute 6.2 2.0 - 7.7 thou/uL    Lymphocytes Absolute 1.9 0.8 - 4.4 thou/uL    Monocytes Absolute 0.5 0.0 - 0.9 thou/uL    Eosinophils Absolute 0.2 0.0 - 0.4 thou/uL    Basophils Absolute 0.1 0.0 - 0.2 thou/uL     No results found.  MEDICATIONS     Current Outpatient Prescriptions on File Prior to Visit   Medication Sig Dispense Refill     GIANVI, 28, 3-0.02 mg per tablet Take 1 tablet by mouth daily. 84 tablet 3     No current facility-administered medications on file prior to visit.        HEALTH MAINTENANCE /  SCREENING   PHQ-2 Total Score: 2 (1/19/2018  5:00 PM), PHQ-9 Total Score: 4 (1/19/2018  5:00 PM),QUE-7 Total: 4 (1/19/2018  5:00 PM)  Immunization History   Administered Date(s) Administered     DTaP, historic 1994, 1994, 01/26/1995, 11/30/1995, 08/09/1999     HPV Quadrivalent 01/19/2006, 11/09/2006, 01/30/2007, 06/04/2007     Hep A, historic 01/09/2013, 08/07/2014     HiB, historic,unspecified 1994, 1994, 01/26/1995, 11/30/1995     IG-IM 03/23/2011     IPV 1994, 1994, 01/26/1995, 08/09/1999     Influenza G2m6-56, 12/07/2009     Influenza, inj, historic,unspecified 12/05/2001, 10/15/2009, 11/17/2010, 10/05/2011     MMR 08/09/1999     Meningococcal MCV4 Conjugate,Unspecified 08/01/2005, 01/09/2013     Meningococcal,Historic,Unknown serogroups 08/01/2005     Rabies, historic 03/23/2011, 03/26/2011, 03/30/2011, 04/06/2011, 04/20/2011     Td, adult adsorbed, PF 06/23/2016     Tdap 05/25/2006     Varicella 08/17/1995, 08/07/2009     Health Maintenance   Topic     DEPRESSION FOLLOW UP      INFLUENZA VACCINE RULE BASED (1)     ADVANCE DIRECTIVES DISCUSSED WITH PATIENT      PAP SMEAR      TD 18+ HE      HPV VACCINES      TDAP ADULT ONE TIME DOSE      The following are part of a depression follow up plan for the patient:  coping support assessment, coping support management, emotional support assessment and emotional support education  India Chairez MD  Family Medicine, [facility]     This note was dictated using a voice recognition software.  Any grammatical or context distortion are unintentional and inherent to the software.

## 2021-06-28 NOTE — PROGRESS NOTES
Progress Notes by India Chairez MD at 10/24/2019 12:27 PM     Author: India Chairez MD Service: -- Author Type: Physician    Filed: 10/24/2019 12:28 PM Encounter Date: 10/24/2019 Status: Signed    : India Chairez MD (Physician)         Assessment:   Diagnoses of Gastro-esophageal reflux disease without esophagitis and Heartburn were pertinent to this visit.     Plan:     Medications Ordered   Medications   ? famotidine (PEPCID) 20 MG tablet     Sig: Take 1 tablet (20 mg total) by mouth 2 (two) times a day.     Dispense:  60 tablet     Refill:  2   ? sucralfate (CARAFATE) 100 mg/mL suspension     Sig: Take 10 mL (1 g total) by mouth 4 (four) times a day.     Dispense:  420 mL     Refill:  1     Patient Instructions         Lifestyle Changes for Controlling GERD  When you have GERD, stomach acid feels as if its backing up toward your mouth. Making lifestyle changes can often improve your symptoms. This is true if you take medicine to control your GERD or not. Talk with your healthcare provider about the following suggestions. They may help you get relief from your symptoms.      Raise your head  Reflux is more likely to happen when youre lying down flat. That's because stomach fluid can flow backward more easily. Raising the head of your bed 4 to 6 inches can help. To do this:    Slide blocks or books under the legs at the head of your bed. Or put a wedge under the mattress. Many [employer] stores can make a wedge for you. The wedge should go from your waist to the top of your head.    Dont just prop your head up on a few pillows. This increases pressure on your stomach. It can make GERD worse.  Watch your eating habits  Certain foods may increase the acid in your stomach. Or they may relax the lower esophageal sphincter. This makes GERD more likely. Its best to avoid the following if they cause you symptoms:    Coffee, tea, and carbonated drinks (with and without  caffeine)    Fatty, fried, or spicy food    Mint, chocolate, onions, tomatoes, and citrus    Peppermint    Any other foods that seem to irritate your stomach or cause you pain  Relieve the pressure  Tips include the following:    Eat smaller meals, even if you have to eat more often.    Dont lie down right after you eat. Wait a few hours for your stomach to empty.    Don't wear tight belts or tight-fitting clothes.    Lose any extra weight.  Tobacco and alcohol  Don't smoke tobacco or drink alcohol. They can make GERD symptoms worse.  Date Last Reviewed: 7/1/2016 2000-2019 [employer]. [address] [place] [address]. All rights reserved. This information is not intended as a substitute for professional medical care. Always follow your healthcare professional's instructions.        Avoid eating before sleep, and avoid alcohol and caffeine while symptoms are ongoing.    Return in 2 weeks (on 11/7/2019) for further follow up if needed. Call [phone] or schedule an appointment via [employer]..    Subjective:   Dorcas Taylor is a 24 y.o. female who submitted an eVisit request for evaluation of her Heartburn.  See the questionnaire and message section of encounter report for information related to history of present illness and review of systems.    The following portions of the patient's history were reviewed and updated as appropriate:  She does not have any pertinent problems on file.  She is allergic to septra [sulfamethoxazole-trimethoprim] and sulfa (sulfonamide antibiotics)..     Objective:   No exam performed today, patient submitted as eVisit.

## 2021-07-03 NOTE — ADDENDUM NOTE
Addendum Note by India Chairez MD at 5/14/2020  3:28 PM     Author: India Chairez MD Service: -- Author Type: Physician    Filed: 5/14/2020  3:28 PM Encounter Date: 5/13/2020 Status: Signed    : India Chairez MD (Physician)    Addended by: INDIA CHAIREZ on: 5/14/2020 03:28 PM        Modules accepted: Orders

## 2021-08-21 ENCOUNTER — HEALTH MAINTENANCE LETTER (OUTPATIENT)
Age: 26
End: 2021-08-21

## 2021-10-16 ENCOUNTER — HEALTH MAINTENANCE LETTER (OUTPATIENT)
Age: 26
End: 2021-10-16

## 2022-10-01 ENCOUNTER — HEALTH MAINTENANCE LETTER (OUTPATIENT)
Age: 27
End: 2022-10-01

## 2024-03-03 ENCOUNTER — HEALTH MAINTENANCE LETTER (OUTPATIENT)
Age: 29
End: 2024-03-03